# Patient Record
Sex: MALE | Race: WHITE | NOT HISPANIC OR LATINO | Employment: OTHER | URBAN - METROPOLITAN AREA
[De-identification: names, ages, dates, MRNs, and addresses within clinical notes are randomized per-mention and may not be internally consistent; named-entity substitution may affect disease eponyms.]

---

## 2023-02-09 PROBLEM — B02.9 SHINGLES: Status: ACTIVE | Noted: 2023-02-09

## 2023-02-09 PROBLEM — R23.3 PETECHIAL RASH: Status: ACTIVE | Noted: 2023-02-09

## 2023-02-09 PROBLEM — L70.8 ACNEIFORM DERMATITIS: Status: ACTIVE | Noted: 2023-02-09

## 2023-02-09 PROBLEM — L03.90 CELLULITIS: Status: ACTIVE | Noted: 2023-02-09

## 2023-02-09 PROBLEM — I25.10 CAD (CORONARY ARTERY DISEASE): Status: ACTIVE | Noted: 2023-02-09

## 2023-02-09 PROBLEM — N52.9 MALE ERECTILE DISORDER OF ORGANIC ORIGIN: Status: ACTIVE | Noted: 2023-02-09

## 2023-02-09 PROBLEM — I10 HYPERTENSION: Status: ACTIVE | Noted: 2023-02-09

## 2023-02-09 PROBLEM — I77.89 ENLARGED THORACIC AORTA (CMS-HCC): Status: ACTIVE | Noted: 2023-02-09

## 2023-02-09 PROBLEM — I35.0 AORTIC STENOSIS: Status: ACTIVE | Noted: 2023-02-09

## 2023-02-09 PROBLEM — L57.0 SOLAR KERATOSIS: Status: ACTIVE | Noted: 2023-02-09

## 2023-02-09 PROBLEM — J03.00 STREP TONSILLITIS: Status: RESOLVED | Noted: 2023-02-09 | Resolved: 2023-02-09

## 2023-02-09 PROBLEM — D23.20: Status: ACTIVE | Noted: 2023-02-09

## 2023-02-09 PROBLEM — E03.9 HYPOTHYROIDISM: Status: ACTIVE | Noted: 2023-02-09

## 2023-02-09 PROBLEM — E78.5 HYPERLIPIDEMIA: Status: ACTIVE | Noted: 2023-02-09

## 2023-02-09 RX ORDER — AMLODIPINE BESYLATE 2.5 MG/1
1 TABLET ORAL DAILY
COMMUNITY
End: 2023-04-04 | Stop reason: SDUPTHER

## 2023-02-09 RX ORDER — METOPROLOL TARTRATE 25 MG/1
1 TABLET, FILM COATED ORAL 2 TIMES DAILY
COMMUNITY

## 2023-02-09 RX ORDER — LEVOTHYROXINE SODIUM 150 UG/1
1 TABLET ORAL DAILY
COMMUNITY
End: 2023-04-04 | Stop reason: SDUPTHER

## 2023-02-09 RX ORDER — MULTIVITAMIN
1 TABLET ORAL DAILY
COMMUNITY

## 2023-04-04 ENCOUNTER — OFFICE VISIT (OUTPATIENT)
Dept: PRIMARY CARE | Facility: CLINIC | Age: 73
End: 2023-04-04
Payer: MEDICARE

## 2023-04-04 VITALS
OXYGEN SATURATION: 98 % | WEIGHT: 200 LBS | HEART RATE: 63 BPM | TEMPERATURE: 97.6 F | BODY MASS INDEX: 28.63 KG/M2 | SYSTOLIC BLOOD PRESSURE: 120 MMHG | HEIGHT: 70 IN | RESPIRATION RATE: 17 BRPM | DIASTOLIC BLOOD PRESSURE: 70 MMHG

## 2023-04-04 DIAGNOSIS — I25.10 CORONARY ARTERY DISEASE INVOLVING NATIVE HEART WITHOUT ANGINA PECTORIS, UNSPECIFIED VESSEL OR LESION TYPE: ICD-10-CM

## 2023-04-04 DIAGNOSIS — R73.9 HYPERGLYCEMIA: ICD-10-CM

## 2023-04-04 DIAGNOSIS — E03.9 HYPOTHYROIDISM, UNSPECIFIED TYPE: ICD-10-CM

## 2023-04-04 DIAGNOSIS — Z12.5 PROSTATE CANCER SCREENING: ICD-10-CM

## 2023-04-04 DIAGNOSIS — E78.2 MIXED HYPERLIPIDEMIA: ICD-10-CM

## 2023-04-04 DIAGNOSIS — Z00.00 MEDICARE ANNUAL WELLNESS VISIT, SUBSEQUENT: Primary | ICD-10-CM

## 2023-04-04 DIAGNOSIS — I10 PRIMARY HYPERTENSION: ICD-10-CM

## 2023-04-04 PROCEDURE — G0439 PPPS, SUBSEQ VISIT: HCPCS | Performed by: FAMILY MEDICINE

## 2023-04-04 PROCEDURE — 99214 OFFICE O/P EST MOD 30 MIN: CPT | Performed by: FAMILY MEDICINE

## 2023-04-04 RX ORDER — AMLODIPINE BESYLATE 2.5 MG/1
2.5 TABLET ORAL DAILY
Qty: 90 TABLET | Refills: 3 | Status: SHIPPED | OUTPATIENT
Start: 2023-04-04 | End: 2024-04-03

## 2023-04-04 RX ORDER — LEVOTHYROXINE SODIUM 150 UG/1
150 TABLET ORAL DAILY
Qty: 90 TABLET | Refills: 2 | Status: SHIPPED | OUTPATIENT
Start: 2023-04-04 | End: 2024-01-30 | Stop reason: SDUPTHER

## 2023-04-04 RX ORDER — ROSUVASTATIN CALCIUM 40 MG/1
40 TABLET, COATED ORAL DAILY
COMMUNITY

## 2023-04-04 ASSESSMENT — ACTIVITIES OF DAILY LIVING (ADL)
MANAGING_FINANCES: INDEPENDENT
DOING_HOUSEWORK: INDEPENDENT
BATHING: INDEPENDENT
DRESSING: INDEPENDENT
GROCERY_SHOPPING: INDEPENDENT
TAKING_MEDICATION: INDEPENDENT

## 2023-04-04 ASSESSMENT — ENCOUNTER SYMPTOMS
DEPRESSION: 0
RESPIRATORY NEGATIVE: 1
GASTROINTESTINAL NEGATIVE: 1
ALLERGIC/IMMUNOLOGIC NEGATIVE: 1
MUSCULOSKELETAL NEGATIVE: 1
NEUROLOGICAL NEGATIVE: 1
ENDOCRINE NEGATIVE: 1
OCCASIONAL FEELINGS OF UNSTEADINESS: 0
CONSTITUTIONAL NEGATIVE: 1
HEMATOLOGIC/LYMPHATIC NEGATIVE: 1
PSYCHIATRIC NEGATIVE: 1
EYES NEGATIVE: 1
LOSS OF SENSATION IN FEET: 0
CARDIOVASCULAR NEGATIVE: 1

## 2023-04-04 ASSESSMENT — PATIENT HEALTH QUESTIONNAIRE - PHQ9
2. FEELING DOWN, DEPRESSED OR HOPELESS: NOT AT ALL
1. LITTLE INTEREST OR PLEASURE IN DOING THINGS: NOT AT ALL
SUM OF ALL RESPONSES TO PHQ9 QUESTIONS 1 AND 2: 0

## 2023-04-04 NOTE — PROGRESS NOTES
"Subjective   Reason for Visit: Hunter Combs is an 72 y.o. male here for a Medicare Wellness visit.     Past Medical, Surgical, and Family History reviewed and updated in chart.    Reviewed all medications by prescribing practitioner or clinical pharmacist (such as prescriptions, OTCs, herbal therapies and supplements) and documented in the medical record.    HPI    Patient Care Team:  Sabino Gibson DO as PCP - General  JENSEN Roach as PCP - [facility] Attributed Provider     Review of Systems   Constitutional: Negative.    HENT: Negative.     Eyes: Negative.    Respiratory: Negative.     Cardiovascular: Negative.    Gastrointestinal: Negative.    Endocrine: Negative.    Genitourinary: Negative.    Musculoskeletal: Negative.    Skin: Negative.    Allergic/Immunologic: Negative.    Neurological: Negative.    Hematological: Negative.    Psychiatric/Behavioral: Negative.         Objective   Vitals:  /70 (BP Location: Right arm, Patient Position: Sitting)   Pulse 63   Temp 36.4 °C (97.6 °F)   Resp 17   Ht 1.778 m (5' 10\")   Wt 90.7 kg (200 lb)   SpO2 98%   BMI 28.70 kg/m²       Physical Exam  Vitals and nursing note reviewed.   Constitutional:       Appearance: Normal appearance.   HENT:      Head: Normocephalic and atraumatic.      Nose: Nose normal.      Mouth/Throat:      Pharynx: Oropharynx is clear.   Eyes:      Extraocular Movements: Extraocular movements intact.      Conjunctiva/sclera: Conjunctivae normal.      Pupils: Pupils are equal, round, and reactive to light.   Neck:      Vascular: No carotid bruit.   Cardiovascular:      Rate and Rhythm: Normal rate and regular rhythm.      Pulses: Normal pulses.      Heart sounds: Normal heart sounds.   Pulmonary:      Effort: Pulmonary effort is normal. No respiratory distress.      Breath sounds: Normal breath sounds. No wheezing, rhonchi or rales.   Abdominal:      General: Abdomen is flat. Bowel sounds are normal. There is no distension.    "   Palpations: Abdomen is soft.      Tenderness: There is no abdominal tenderness.      Hernia: No hernia is present.   Musculoskeletal:         General: No swelling or tenderness. Normal range of motion.      Cervical back: Normal range of motion and neck supple. No tenderness.   Lymphadenopathy:      Cervical: No cervical adenopathy.   Skin:     General: Skin is warm and dry.      Capillary Refill: Capillary refill takes less than 2 seconds.   Neurological:      General: No focal deficit present.      Mental Status: He is alert and oriented to person, place, and time.      Cranial Nerves: No cranial nerve deficit.   Psychiatric:         Attention and Perception: Attention and perception normal.         Mood and Affect: Mood normal.         Behavior: Behavior normal.         Thought Content: Thought content normal.         Judgment: Judgment normal.         Assessment/Plan   Problem List Items Addressed This Visit          Endocrine/Metabolic    Hypothyroidism - Primary   1. Medicare annual wellness visit, subsequent      2. Hypothyroidism, unspecified type    - levothyroxine (Synthroid, Levoxyl) 150 mcg tablet; Take 1 tablet (150 mcg) by mouth once daily.  Dispense: 90 tablet; Refill: 2  - Hemoglobin A1C; Future  - TSH with reflex to Free T4 if abnormal; Future    3. Coronary artery disease involving native heart without angina pectoris, unspecified vessel or lesion type    - Comprehensive Metabolic Panel; Future  - Hemoglobin A1C; Future  - Lipid Panel; Future  - CBC and Auto Differential; Future  - amLODIPine (Norvasc) 2.5 mg tablet; Take 1 tablet (2.5 mg) by mouth once daily.  Dispense: 90 tablet; Refill: 3    4. Primary hypertension    - Comprehensive Metabolic Panel; Future  - Hemoglobin A1C; Future  - Lipid Panel; Future  - CBC and Auto Differential; Future    5. Mixed hyperlipidemia    - Comprehensive Metabolic Panel; Future  - Hemoglobin A1C; Future  - Lipid Panel; Future  - CBC and Auto Differential;  Future    6. Prostate cancer screening    - Prostate Specific Antigen; Future    7. Hyperglycemia    - Hemoglobin A1C; Future

## 2023-04-14 ENCOUNTER — LAB (OUTPATIENT)
Dept: LAB | Facility: LAB | Age: 73
End: 2023-04-14
Payer: MEDICARE

## 2023-04-14 DIAGNOSIS — Z12.5 PROSTATE CANCER SCREENING: ICD-10-CM

## 2023-04-14 DIAGNOSIS — R73.9 HYPERGLYCEMIA: ICD-10-CM

## 2023-04-14 DIAGNOSIS — E78.2 MIXED HYPERLIPIDEMIA: ICD-10-CM

## 2023-04-14 DIAGNOSIS — E03.9 HYPOTHYROIDISM, UNSPECIFIED TYPE: ICD-10-CM

## 2023-04-14 DIAGNOSIS — I25.10 CORONARY ARTERY DISEASE INVOLVING NATIVE HEART WITHOUT ANGINA PECTORIS, UNSPECIFIED VESSEL OR LESION TYPE: ICD-10-CM

## 2023-04-14 DIAGNOSIS — I10 PRIMARY HYPERTENSION: ICD-10-CM

## 2023-04-14 LAB
ALANINE AMINOTRANSFERASE (SGPT) (U/L) IN SER/PLAS: 28 U/L (ref 10–52)
ALBUMIN (G/DL) IN SER/PLAS: 4.6 G/DL (ref 3.4–5)
ALKALINE PHOSPHATASE (U/L) IN SER/PLAS: 61 U/L (ref 33–136)
ANION GAP IN SER/PLAS: 16 MMOL/L (ref 10–20)
ASPARTATE AMINOTRANSFERASE (SGOT) (U/L) IN SER/PLAS: 30 U/L (ref 9–39)
BASOPHILS (10*3/UL) IN BLOOD BY AUTOMATED COUNT: 0.05 X10E9/L (ref 0–0.1)
BASOPHILS/100 LEUKOCYTES IN BLOOD BY AUTOMATED COUNT: 0.7 % (ref 0–2)
BILIRUBIN TOTAL (MG/DL) IN SER/PLAS: 0.7 MG/DL (ref 0–1.2)
CALCIUM (MG/DL) IN SER/PLAS: 9.9 MG/DL (ref 8.6–10.3)
CARBON DIOXIDE, TOTAL (MMOL/L) IN SER/PLAS: 24 MMOL/L (ref 21–32)
CHLORIDE (MMOL/L) IN SER/PLAS: 104 MMOL/L (ref 98–107)
CHOLESTEROL (MG/DL) IN SER/PLAS: 129 MG/DL (ref 0–199)
CHOLESTEROL IN HDL (MG/DL) IN SER/PLAS: 43.8 MG/DL
CHOLESTEROL/HDL RATIO: 2.9
CREATININE (MG/DL) IN SER/PLAS: 1.01 MG/DL (ref 0.5–1.3)
EOSINOPHILS (10*3/UL) IN BLOOD BY AUTOMATED COUNT: 0.08 X10E9/L (ref 0–0.7)
EOSINOPHILS/100 LEUKOCYTES IN BLOOD BY AUTOMATED COUNT: 1.1 % (ref 0–6)
ERYTHROCYTE DISTRIBUTION WIDTH (RATIO) BY AUTOMATED COUNT: 12.9 % (ref 11.5–14.5)
ERYTHROCYTE MEAN CORPUSCULAR HEMOGLOBIN CONCENTRATION (G/DL) BY AUTOMATED: 33.6 G/DL (ref 32–36)
ERYTHROCYTE MEAN CORPUSCULAR VOLUME (FL) BY AUTOMATED COUNT: 88 FL (ref 80–100)
ERYTHROCYTES (10*6/UL) IN BLOOD BY AUTOMATED COUNT: 5.47 X10E12/L (ref 4.5–5.9)
ESTIMATED AVERAGE GLUCOSE FOR HBA1C: 105 MG/DL
GFR MALE: 80 ML/MIN/1.73M2
GLUCOSE (MG/DL) IN SER/PLAS: 74 MG/DL (ref 74–99)
HEMATOCRIT (%) IN BLOOD BY AUTOMATED COUNT: 48.2 % (ref 41–52)
HEMOGLOBIN (G/DL) IN BLOOD: 16.2 G/DL (ref 13.5–17.5)
HEMOGLOBIN A1C/HEMOGLOBIN TOTAL IN BLOOD: 5.3 %
IMMATURE GRANULOCYTES/100 LEUKOCYTES IN BLOOD BY AUTOMATED COUNT: 0.1 % (ref 0–0.9)
LDL: 67 MG/DL (ref 0–99)
LEUKOCYTES (10*3/UL) IN BLOOD BY AUTOMATED COUNT: 7.4 X10E9/L (ref 4.4–11.3)
LYMPHOCYTES (10*3/UL) IN BLOOD BY AUTOMATED COUNT: 1.85 X10E9/L (ref 1.2–4.8)
LYMPHOCYTES/100 LEUKOCYTES IN BLOOD BY AUTOMATED COUNT: 25.1 % (ref 13–44)
MONOCYTES (10*3/UL) IN BLOOD BY AUTOMATED COUNT: 0.85 X10E9/L (ref 0.1–1)
MONOCYTES/100 LEUKOCYTES IN BLOOD BY AUTOMATED COUNT: 11.5 % (ref 2–10)
NEUTROPHILS (10*3/UL) IN BLOOD BY AUTOMATED COUNT: 4.52 X10E9/L (ref 1.2–7.7)
NEUTROPHILS/100 LEUKOCYTES IN BLOOD BY AUTOMATED COUNT: 61.5 % (ref 40–80)
PLATELETS (10*3/UL) IN BLOOD AUTOMATED COUNT: 216 X10E9/L (ref 150–450)
POTASSIUM (MMOL/L) IN SER/PLAS: 4.4 MMOL/L (ref 3.5–5.3)
PROSTATE SPECIFIC AG (NG/ML) IN SER/PLAS: 0.14 NG/ML (ref 0–4)
PROTEIN TOTAL: 7.2 G/DL (ref 6.4–8.2)
SODIUM (MMOL/L) IN SER/PLAS: 140 MMOL/L (ref 136–145)
THYROTROPIN (MIU/L) IN SER/PLAS BY DETECTION LIMIT <= 0.05 MIU/L: 0.38 MIU/L (ref 0.44–3.98)
THYROXINE (T4) FREE (NG/DL) IN SER/PLAS: 1.22 NG/DL (ref 0.61–1.12)
TRIGLYCERIDE (MG/DL) IN SER/PLAS: 91 MG/DL (ref 0–149)
UREA NITROGEN (MG/DL) IN SER/PLAS: 19 MG/DL (ref 6–23)
VLDL: 18 MG/DL (ref 0–40)

## 2023-04-14 PROCEDURE — G0103 PSA SCREENING: HCPCS

## 2023-04-14 PROCEDURE — 80053 COMPREHEN METABOLIC PANEL: CPT

## 2023-04-14 PROCEDURE — 84439 ASSAY OF FREE THYROXINE: CPT

## 2023-04-14 PROCEDURE — 85025 COMPLETE CBC W/AUTO DIFF WBC: CPT

## 2023-04-14 PROCEDURE — 83036 HEMOGLOBIN GLYCOSYLATED A1C: CPT

## 2023-04-14 PROCEDURE — 80061 LIPID PANEL: CPT

## 2023-04-14 PROCEDURE — 84443 ASSAY THYROID STIM HORMONE: CPT

## 2023-04-14 PROCEDURE — 36415 COLL VENOUS BLD VENIPUNCTURE: CPT

## 2023-04-17 ENCOUNTER — TELEPHONE (OUTPATIENT)
Dept: PRIMARY CARE | Facility: CLINIC | Age: 73
End: 2023-04-17
Payer: MEDICARE

## 2023-04-17 NOTE — TELEPHONE ENCOUNTER
----- Message from Sabino Gibson DO sent at 4/16/2023  4:53 PM EDT -----  Call Hunter Combs Their labs were normal  His t4 was elevated by 0.1 we will keep an eye on this   The resot of his labs look good

## 2024-01-30 DIAGNOSIS — E03.9 HYPOTHYROIDISM, UNSPECIFIED TYPE: ICD-10-CM

## 2024-01-30 RX ORDER — LEVOTHYROXINE SODIUM 150 UG/1
150 TABLET ORAL DAILY
Qty: 90 TABLET | Refills: 3 | Status: SHIPPED | OUTPATIENT
Start: 2024-01-30

## 2024-01-30 NOTE — TELEPHONE ENCOUNTER
Patient requests prescription below    Last Office Visit: 4/4/2023     Requested Prescriptions     Pending Prescriptions Disp Refills    levothyroxine (Synthroid, Levoxyl) 150 mcg tablet 90 tablet 2     Sig: Take 1 tablet (150 mcg) by mouth once daily.   \

## 2024-04-05 ENCOUNTER — APPOINTMENT (OUTPATIENT)
Dept: PRIMARY CARE | Facility: CLINIC | Age: 74
End: 2024-04-05
Payer: MEDICARE

## 2024-04-12 ENCOUNTER — LAB (OUTPATIENT)
Dept: LAB | Facility: LAB | Age: 74
End: 2024-04-12
Payer: MEDICARE

## 2024-04-12 ENCOUNTER — OFFICE VISIT (OUTPATIENT)
Dept: PRIMARY CARE | Facility: CLINIC | Age: 74
End: 2024-04-12
Payer: MEDICARE

## 2024-04-12 VITALS
SYSTOLIC BLOOD PRESSURE: 144 MMHG | BODY MASS INDEX: 29.06 KG/M2 | TEMPERATURE: 98.1 F | HEIGHT: 70 IN | WEIGHT: 203 LBS | RESPIRATION RATE: 18 BRPM | DIASTOLIC BLOOD PRESSURE: 86 MMHG | HEART RATE: 61 BPM

## 2024-04-12 DIAGNOSIS — Z12.5 PROSTATE CANCER SCREENING: ICD-10-CM

## 2024-04-12 DIAGNOSIS — E78.2 MIXED HYPERLIPIDEMIA: ICD-10-CM

## 2024-04-12 DIAGNOSIS — E03.9 HYPOTHYROIDISM, UNSPECIFIED TYPE: ICD-10-CM

## 2024-04-12 DIAGNOSIS — Z00.00 MEDICARE ANNUAL WELLNESS VISIT, SUBSEQUENT: ICD-10-CM

## 2024-04-12 DIAGNOSIS — R73.9 HYPERGLYCEMIA: ICD-10-CM

## 2024-04-12 DIAGNOSIS — I25.118 ATHEROSCLEROTIC HEART DISEASE OF NATIVE CORONARY ARTERY WITH OTHER FORMS OF ANGINA PECTORIS (CMS-HCC): ICD-10-CM

## 2024-04-12 DIAGNOSIS — Z00.00 MEDICARE ANNUAL WELLNESS VISIT, SUBSEQUENT: Primary | ICD-10-CM

## 2024-04-12 DIAGNOSIS — R25.2 LEG CRAMPS: ICD-10-CM

## 2024-04-12 DIAGNOSIS — I77.89 ENLARGED THORACIC AORTA (CMS-HCC): ICD-10-CM

## 2024-04-12 DIAGNOSIS — I10 PRIMARY HYPERTENSION: ICD-10-CM

## 2024-04-12 LAB
ALBUMIN SERPL BCP-MCNC: 4.7 G/DL (ref 3.4–5)
ALP SERPL-CCNC: 60 U/L (ref 33–136)
ALT SERPL W P-5'-P-CCNC: 25 U/L (ref 10–52)
ANION GAP SERPL CALC-SCNC: 11 MMOL/L (ref 10–20)
AST SERPL W P-5'-P-CCNC: 27 U/L (ref 9–39)
BASOPHILS # BLD AUTO: 0.03 X10*3/UL (ref 0–0.1)
BASOPHILS NFR BLD AUTO: 0.5 %
BILIRUB SERPL-MCNC: 0.6 MG/DL (ref 0–1.2)
BUN SERPL-MCNC: 15 MG/DL (ref 6–23)
CALCIUM SERPL-MCNC: 10 MG/DL (ref 8.6–10.3)
CHLORIDE SERPL-SCNC: 104 MMOL/L (ref 98–107)
CHOLEST SERPL-MCNC: 137 MG/DL (ref 0–199)
CHOLESTEROL/HDL RATIO: 2.7
CO2 SERPL-SCNC: 29 MMOL/L (ref 21–32)
CREAT SERPL-MCNC: 0.92 MG/DL (ref 0.5–1.3)
EGFRCR SERPLBLD CKD-EPI 2021: 89 ML/MIN/1.73M*2
EOSINOPHIL # BLD AUTO: 0.06 X10*3/UL (ref 0–0.4)
EOSINOPHIL NFR BLD AUTO: 0.9 %
ERYTHROCYTE [DISTWIDTH] IN BLOOD BY AUTOMATED COUNT: 13.2 % (ref 11.5–14.5)
EST. AVERAGE GLUCOSE BLD GHB EST-MCNC: 97 MG/DL
GLUCOSE SERPL-MCNC: 82 MG/DL (ref 74–99)
HBA1C MFR BLD: 5 %
HCT VFR BLD AUTO: 47.2 % (ref 41–52)
HDLC SERPL-MCNC: 51.4 MG/DL
HGB BLD-MCNC: 16.2 G/DL (ref 13.5–17.5)
IMM GRANULOCYTES # BLD AUTO: 0.01 X10*3/UL (ref 0–0.5)
IMM GRANULOCYTES NFR BLD AUTO: 0.2 % (ref 0–0.9)
LDLC SERPL CALC-MCNC: 68 MG/DL
LYMPHOCYTES # BLD AUTO: 1.86 X10*3/UL (ref 0.8–3)
LYMPHOCYTES NFR BLD AUTO: 28.2 %
MAGNESIUM SERPL-MCNC: 2.16 MG/DL (ref 1.6–2.4)
MCH RBC QN AUTO: 29.9 PG (ref 26–34)
MCHC RBC AUTO-ENTMCNC: 34.3 G/DL (ref 32–36)
MCV RBC AUTO: 87 FL (ref 80–100)
MONOCYTES # BLD AUTO: 0.78 X10*3/UL (ref 0.05–0.8)
MONOCYTES NFR BLD AUTO: 11.8 %
NEUTROPHILS # BLD AUTO: 3.86 X10*3/UL (ref 1.6–5.5)
NEUTROPHILS NFR BLD AUTO: 58.4 %
NON HDL CHOLESTEROL: 86 MG/DL (ref 0–149)
NRBC BLD-RTO: 0 /100 WBCS (ref 0–0)
PLATELET # BLD AUTO: 255 X10*3/UL (ref 150–450)
POTASSIUM SERPL-SCNC: 4.3 MMOL/L (ref 3.5–5.3)
PROT SERPL-MCNC: 7.3 G/DL (ref 6.4–8.2)
PSA SERPL-MCNC: 0.36 NG/ML
RBC # BLD AUTO: 5.42 X10*6/UL (ref 4.5–5.9)
SODIUM SERPL-SCNC: 140 MMOL/L (ref 136–145)
T4 FREE SERPL-MCNC: 1.01 NG/DL (ref 0.61–1.12)
TRIGL SERPL-MCNC: 90 MG/DL (ref 0–149)
TSH SERPL-ACNC: 0.66 MIU/L (ref 0.44–3.98)
VLDL: 18 MG/DL (ref 0–40)
WBC # BLD AUTO: 6.6 X10*3/UL (ref 4.4–11.3)

## 2024-04-12 PROCEDURE — 84439 ASSAY OF FREE THYROXINE: CPT

## 2024-04-12 PROCEDURE — 99214 OFFICE O/P EST MOD 30 MIN: CPT | Performed by: FAMILY MEDICINE

## 2024-04-12 PROCEDURE — 84443 ASSAY THYROID STIM HORMONE: CPT

## 2024-04-12 PROCEDURE — 80053 COMPREHEN METABOLIC PANEL: CPT

## 2024-04-12 PROCEDURE — G0439 PPPS, SUBSEQ VISIT: HCPCS | Performed by: FAMILY MEDICINE

## 2024-04-12 PROCEDURE — 83735 ASSAY OF MAGNESIUM: CPT

## 2024-04-12 PROCEDURE — 80061 LIPID PANEL: CPT

## 2024-04-12 PROCEDURE — 85025 COMPLETE CBC W/AUTO DIFF WBC: CPT

## 2024-04-12 PROCEDURE — G0103 PSA SCREENING: HCPCS

## 2024-04-12 PROCEDURE — 83036 HEMOGLOBIN GLYCOSYLATED A1C: CPT

## 2024-04-12 PROCEDURE — 36415 COLL VENOUS BLD VENIPUNCTURE: CPT

## 2024-04-12 ASSESSMENT — ACTIVITIES OF DAILY LIVING (ADL)
DRESSING: INDEPENDENT
GROCERY_SHOPPING: INDEPENDENT
MANAGING_FINANCES: INDEPENDENT
TAKING_MEDICATION: INDEPENDENT
DOING_HOUSEWORK: INDEPENDENT
BATHING: INDEPENDENT

## 2024-04-12 ASSESSMENT — ENCOUNTER SYMPTOMS
EYES NEGATIVE: 1
RESPIRATORY NEGATIVE: 1
CONSTITUTIONAL NEGATIVE: 1
NEUROLOGICAL NEGATIVE: 1
GASTROINTESTINAL NEGATIVE: 1
CARDIOVASCULAR NEGATIVE: 1
HEMATOLOGIC/LYMPHATIC NEGATIVE: 1
ENDOCRINE NEGATIVE: 1
BACK PAIN: 1
ARTHRALGIAS: 1
ALLERGIC/IMMUNOLOGIC NEGATIVE: 1
MYALGIAS: 1
PSYCHIATRIC NEGATIVE: 1

## 2024-04-12 ASSESSMENT — PATIENT HEALTH QUESTIONNAIRE - PHQ9
2. FEELING DOWN, DEPRESSED OR HOPELESS: NOT AT ALL
SUM OF ALL RESPONSES TO PHQ9 QUESTIONS 1 AND 2: 0
1. LITTLE INTEREST OR PLEASURE IN DOING THINGS: NOT AT ALL

## 2024-04-12 NOTE — PROGRESS NOTES
"Subjective   Reason for Visit: Hunter Combs is an 73 y.o. male here for a Medicare Wellness visit.     Past Medical, Surgical, and Family History reviewed and updated in chart.    Reviewed all medications by prescribing practitioner or clinical pharmacist (such as prescriptions, OTCs, herbal therapies and supplements) and documented in the medical record.    HPI    Patient Care Team:  Sabino Gibson DO as PCP - General     Review of Systems   Constitutional: Negative.    HENT: Negative.     Eyes: Negative.    Respiratory: Negative.     Cardiovascular: Negative.    Gastrointestinal: Negative.    Endocrine: Negative.    Genitourinary: Negative.    Musculoskeletal:  Positive for arthralgias, back pain and myalgias.   Skin: Negative.    Allergic/Immunologic: Negative.    Neurological: Negative.    Hematological: Negative.    Psychiatric/Behavioral: Negative.         Objective   Vitals:  /86   Pulse 61   Temp 36.7 °C (98.1 °F)   Resp 18   Ht 1.778 m (5' 10\")   Wt 92.1 kg (203 lb)   BMI 29.13 kg/m²       Physical Exam  Vitals and nursing note reviewed.   Constitutional:       Appearance: Normal appearance.   HENT:      Head: Normocephalic and atraumatic.      Nose: Nose normal.      Mouth/Throat:      Pharynx: Oropharynx is clear.   Eyes:      Extraocular Movements: Extraocular movements intact.      Conjunctiva/sclera: Conjunctivae normal.      Pupils: Pupils are equal, round, and reactive to light.   Neck:      Vascular: No carotid bruit.   Cardiovascular:      Rate and Rhythm: Normal rate and regular rhythm.      Pulses: Normal pulses.      Heart sounds: Normal heart sounds.   Pulmonary:      Effort: Pulmonary effort is normal. No respiratory distress.      Breath sounds: Normal breath sounds. No wheezing, rhonchi or rales.   Abdominal:      General: Abdomen is flat. Bowel sounds are normal. There is no distension.      Palpations: Abdomen is soft.      Tenderness: There is no abdominal tenderness.      " Hernia: No hernia is present.   Musculoskeletal:         General: No swelling. Normal range of motion.      Cervical back: Normal range of motion and neck supple. No tenderness.      Thoracic back: Tenderness present.        Back:    Lymphadenopathy:      Cervical: No cervical adenopathy.   Skin:     General: Skin is warm and dry.      Capillary Refill: Capillary refill takes less than 2 seconds.   Neurological:      General: No focal deficit present.      Mental Status: He is alert and oriented to person, place, and time.      Cranial Nerves: No cranial nerve deficit.   Psychiatric:         Attention and Perception: Attention and perception normal.         Mood and Affect: Mood normal.         Behavior: Behavior normal.         Thought Content: Thought content normal.         Judgment: Judgment normal.         Assessment/Plan   Problem List Items Addressed This Visit       Atherosclerotic heart disease of native coronary artery with other forms of angina pectoris (CMS-HCC)    Current Assessment & Plan     Dx reviewed pt sees cardio         Relevant Orders    CBC and Auto Differential    Comprehensive Metabolic Panel    Hemoglobin A1C    Lipid Panel    Prostate Specific Antigen    Thyroid Stimulating Hormone    T4, free    Enlarged thoracic aorta (CMS-HCC)    Current Assessment & Plan     DX reviewed pt sees cardio and have valve repair         Relevant Orders    CBC and Auto Differential    Comprehensive Metabolic Panel    Hemoglobin A1C    Lipid Panel    Prostate Specific Antigen    Thyroid Stimulating Hormone    T4, free    Hyperlipidemia    Relevant Orders    CBC and Auto Differential    Comprehensive Metabolic Panel    Hemoglobin A1C    Lipid Panel    Prostate Specific Antigen    Thyroid Stimulating Hormone    T4, free    Hypertension    Relevant Orders    CBC and Auto Differential    Comprehensive Metabolic Panel    Hemoglobin A1C    Lipid Panel    Prostate Specific Antigen    Thyroid Stimulating Hormone     T4, free    Hypothyroidism    Relevant Orders    CBC and Auto Differential    Comprehensive Metabolic Panel    Hemoglobin A1C    Lipid Panel    Prostate Specific Antigen    Thyroid Stimulating Hormone    T4, free     Other Visit Diagnoses       Medicare annual wellness visit, subsequent    -  Primary    Relevant Orders    CBC and Auto Differential    Comprehensive Metabolic Panel    Hemoglobin A1C    Lipid Panel    Prostate Specific Antigen    Thyroid Stimulating Hormone    T4, free    Prostate cancer screening        Relevant Orders    CBC and Auto Differential    Comprehensive Metabolic Panel    Hemoglobin A1C    Lipid Panel    Prostate Specific Antigen    Thyroid Stimulating Hormone    T4, free    Hyperglycemia        Relevant Orders    Hemoglobin A1C        Pt to try therapeutic massage for back

## 2024-04-16 ENCOUNTER — TELEPHONE (OUTPATIENT)
Dept: PRIMARY CARE | Facility: CLINIC | Age: 74
End: 2024-04-16
Payer: MEDICARE

## 2024-04-16 NOTE — TELEPHONE ENCOUNTER
----- Message from Sabino Gibson DO sent at 4/16/2024 10:27 AM EDT -----  Call Hunter Combs Their labs were normal

## 2024-04-17 ENCOUNTER — OFFICE VISIT (OUTPATIENT)
Dept: ORTHOPEDIC SURGERY | Facility: CLINIC | Age: 74
End: 2024-04-17
Payer: MEDICARE

## 2024-04-17 DIAGNOSIS — M17.11 PRIMARY OSTEOARTHRITIS OF RIGHT KNEE: Primary | ICD-10-CM

## 2024-04-17 PROCEDURE — 99214 OFFICE O/P EST MOD 30 MIN: CPT | Performed by: STUDENT IN AN ORGANIZED HEALTH CARE EDUCATION/TRAINING PROGRAM

## 2024-04-17 PROCEDURE — 2500000005 HC RX 250 GENERAL PHARMACY W/O HCPCS: Performed by: STUDENT IN AN ORGANIZED HEALTH CARE EDUCATION/TRAINING PROGRAM

## 2024-04-17 PROCEDURE — 20610 DRAIN/INJ JOINT/BURSA W/O US: CPT | Mod: RT | Performed by: STUDENT IN AN ORGANIZED HEALTH CARE EDUCATION/TRAINING PROGRAM

## 2024-04-17 PROCEDURE — 1159F MED LIST DOCD IN RCRD: CPT | Performed by: STUDENT IN AN ORGANIZED HEALTH CARE EDUCATION/TRAINING PROGRAM

## 2024-04-17 PROCEDURE — 2500000004 HC RX 250 GENERAL PHARMACY W/ HCPCS (ALT 636 FOR OP/ED): Performed by: STUDENT IN AN ORGANIZED HEALTH CARE EDUCATION/TRAINING PROGRAM

## 2024-04-17 PROCEDURE — 1036F TOBACCO NON-USER: CPT | Performed by: STUDENT IN AN ORGANIZED HEALTH CARE EDUCATION/TRAINING PROGRAM

## 2024-04-17 RX ORDER — LIDOCAINE HYDROCHLORIDE 10 MG/ML
4 INJECTION INFILTRATION; PERINEURAL
Status: COMPLETED | OUTPATIENT
Start: 2024-04-17 | End: 2024-04-17

## 2024-04-17 RX ORDER — TRIAMCINOLONE ACETONIDE 40 MG/ML
40 INJECTION, SUSPENSION INTRA-ARTICULAR; INTRAMUSCULAR
Status: COMPLETED | OUTPATIENT
Start: 2024-04-17 | End: 2024-04-17

## 2024-04-17 RX ADMIN — LIDOCAINE HYDROCHLORIDE 4 ML: 10 INJECTION, SOLUTION INFILTRATION; PERINEURAL at 05:55

## 2024-04-17 RX ADMIN — TRIAMCINOLONE ACETONIDE 40 MG: 40 INJECTION, SUSPENSION INTRA-ARTICULAR; INTRAMUSCULAR at 05:55

## 2024-04-17 NOTE — PROGRESS NOTES
No chief complaint on file.      HPI  Patient presents today for follow up of the right knee.  Patient with known history of knee arthritis had a intra-articular injection 4/14/2023 this provided him significant relief.  Hoping to get another injection today.  Patient with known history of moderate to severe tricompartmental knee arthritis.  Patient reports last injection provided relief for about a year.    Physical exam  General: Alert and oriented to place, person, and time.  No acute distress and breathing comfortably; pleasant and cooperative with the examination.  Extremity:  Right knee:  Skin healthy and intact  No gross swelling or ecchymosis  Alignment: Varus  Effusion: Mild  ROM: 10-1 10  Crepitance with range of motion  No pain with internal rotation of the hip  Tenderness to palpation: Medial joint line     No laxity to valgus stress  No laxity to varus stress  Negative Lachman´s test  Negative posterior drawer test  Mild pain with Michele´s test     Neurovascular exam normal distally  2+ DP pulse and good cap refill    Diagnostics:  No results found.     Procedures  L Inj/Asp: R knee on 4/17/2024 5:55 AM  Indications: pain  Details: 22 G needle, anterolateral approach  Medications: 40 mg triamcinolone acetonide 40 mg/mL; 4 mL lidocaine 10 mg/mL (1 %)  Consent was given by the patient. Immediately prior to procedure a time out was called to verify the correct patient, procedure, equipment, support staff and site/side marked as required. Patient was prepped and draped in the usual sterile fashion.            Assessment:  73-year-old male with moderate to severe tricompartmental knee arthritis    Treatment plan:  Will proceed with another injection today  Discussed activities to avoid as well as importance of using pain as a guide, if fails to improve may benefit from gel injection therapy  Discussed that ultimate treatment would be a total knee replacement  I discussed risks and benefits of  corticosteroid injection and the patient would like to proceed.  Discussed risks of skin depigmentation and the rare but possible intravascular injection of local anesthetic which can cause palpitations or arrhythmias. I discussed the possibility of a transient increase in blood sugar. I explained that the local anesthetic tends to work immediately, it may take 2-3 days for the full effect of the corticosteroid compound. Consent was obtained and side confirmed by the patient.      All of the patient's questions concerns answered

## 2024-12-22 ENCOUNTER — OFFICE VISIT (OUTPATIENT)
Dept: URGENT CARE | Age: 74
End: 2024-12-22
Payer: MEDICARE

## 2024-12-22 VITALS
BODY MASS INDEX: 29.13 KG/M2 | OXYGEN SATURATION: 98 % | DIASTOLIC BLOOD PRESSURE: 79 MMHG | TEMPERATURE: 97.9 F | SYSTOLIC BLOOD PRESSURE: 162 MMHG | RESPIRATION RATE: 16 BRPM | HEART RATE: 63 BPM | WEIGHT: 203 LBS

## 2024-12-22 DIAGNOSIS — J40 BRONCHITIS: Primary | ICD-10-CM

## 2024-12-22 PROCEDURE — 1159F MED LIST DOCD IN RCRD: CPT

## 2024-12-22 PROCEDURE — 3077F SYST BP >= 140 MM HG: CPT

## 2024-12-22 PROCEDURE — 1036F TOBACCO NON-USER: CPT

## 2024-12-22 PROCEDURE — 1160F RVW MEDS BY RX/DR IN RCRD: CPT

## 2024-12-22 PROCEDURE — 3078F DIAST BP <80 MM HG: CPT

## 2024-12-22 PROCEDURE — 99203 OFFICE O/P NEW LOW 30 MIN: CPT

## 2024-12-22 RX ORDER — BENZONATATE 200 MG/1
200 CAPSULE ORAL 3 TIMES DAILY PRN
Qty: 30 CAPSULE | Refills: 0 | Status: SHIPPED | OUTPATIENT
Start: 2024-12-22 | End: 2025-01-01

## 2024-12-22 RX ORDER — AZITHROMYCIN 250 MG/1
TABLET, FILM COATED ORAL
Qty: 6 TABLET | Refills: 0 | Status: SHIPPED | OUTPATIENT
Start: 2024-12-22 | End: 2024-12-27

## 2024-12-22 ASSESSMENT — ENCOUNTER SYMPTOMS: COUGH: 1

## 2024-12-22 NOTE — PROGRESS NOTES
Subjective   Patient ID: Hunter Combs is a 74 y.o. male. They present today with a chief complaint of URI and Cough.    History of Present Illness  Subjective  Hunter Combs is a 74 y.o. male here for evaluation of a cough. The cough is without wheezing, dyspnea or hemoptysis, productive of green/yellow sputum, harsh, worsening over time and is aggravated by infection. COVID positive 12/7.Onset of symptoms was 2 weeks ago, gradually worsening since that time. Associated symptoms include postnasal drip and sputum production. Patient does not have a history of asthma. Patient has not had recent travel. Patient does not have a history of smoking. Patient has not had a previous chest x-ray.    Gen: No fatigue, fever.     ENT: Endorses nasal congestion, rhinorrhea.  No hearing changes, ear pain, epistaxis, sore throat.    Pulmonary: Endorses cough and sputum production.  No shortness of breath, pleuritic pain, wheezing.     Heme/lymph: No swollen glands     GI: No abdominal pain, nausea, vomiting, diarrhea     Review of systems is otherwise negative unless stated above or in history of present illness.           History provided by:  Patient   used: No    URI  Presenting symptoms: cough    Cough        Past Medical History  Allergies as of 12/22/2024 - Reviewed 12/22/2024   Allergen Reaction Noted    Pollen extracts Itching 09/28/2021    Amoxicillin-pot clavulanate Other 02/09/2023    Codeine Unknown 11/20/2013    Fentanyl Other 04/12/2024       (Not in a hospital admission)       Past Medical History:   Diagnosis Date    Strep tonsillitis 02/09/2023       Past Surgical History:   Procedure Laterality Date    OTHER SURGICAL HISTORY  04/05/2022    Cataract surgery        reports that he has never smoked. He has never used smokeless tobacco. He reports current alcohol use of about 6.0 standard drinks of alcohol per week. He reports that he does not currently use drugs.    Review of Systems  Review of  Systems   Respiratory:  Positive for cough.                                   Objective    Vitals:    12/22/24 1413   BP: 162/79   Pulse: 63   Resp: 16   Temp: 36.6 °C (97.9 °F)   SpO2: 98%   Weight: 92.1 kg (203 lb)     No LMP for male patient.    Physical Exam  Vitals and nursing note reviewed.   Constitutional:       General: He is not in acute distress.     Appearance: Normal appearance. He is normal weight. He is ill-appearing. He is not toxic-appearing or diaphoretic.   HENT:      Nose: Congestion and rhinorrhea present.      Right Turbinates: Enlarged and swollen.      Left Turbinates: Enlarged and swollen.      Mouth/Throat:      Mouth: Mucous membranes are moist.      Pharynx: Postnasal drip present. No pharyngeal swelling or uvula swelling.      Tonsils: No tonsillar exudate or tonsillar abscesses.   Eyes:      General:         Right eye: No discharge.         Left eye: No discharge.      Conjunctiva/sclera: Conjunctivae normal.   Cardiovascular:      Rate and Rhythm: Normal rate and regular rhythm.      Pulses: Normal pulses.      Heart sounds: Normal heart sounds.   Pulmonary:      Effort: Pulmonary effort is normal. No respiratory distress.      Breath sounds: Normal breath sounds. No stridor. No wheezing, rhonchi or rales.   Musculoskeletal:      Cervical back: Normal range of motion and neck supple. No rigidity or tenderness.   Lymphadenopathy:      Cervical: No cervical adenopathy.   Skin:     General: Skin is warm and dry.      Coloration: Skin is not jaundiced or pale.      Findings: No bruising or erythema.   Neurological:      General: No focal deficit present.      Mental Status: He is alert.         Procedures    Point of Care Test & Imaging Results from this visit  No results found for this visit on 12/22/24.   No results found.    Diagnostic study results (if any) were reviewed by KAISER Molina-CNP.    Assessment/Plan   Allergies, medications, history, and pertinent labs/EKGs/Imaging  reviewed by JENSEN Molina.         Orders and Diagnoses  There are no diagnoses linked to this encounter.    Medical Admin Record      Patient disposition: Home    Electronically signed by JENSEN Molina  2:29 PM

## 2025-04-07 ENCOUNTER — APPOINTMENT (OUTPATIENT)
Dept: PRIMARY CARE | Facility: CLINIC | Age: 75
End: 2025-04-07
Payer: MEDICARE

## 2025-04-07 VITALS
RESPIRATION RATE: 17 BRPM | BODY MASS INDEX: 29.63 KG/M2 | HEART RATE: 68 BPM | DIASTOLIC BLOOD PRESSURE: 82 MMHG | OXYGEN SATURATION: 98 % | TEMPERATURE: 96.9 F | SYSTOLIC BLOOD PRESSURE: 132 MMHG | HEIGHT: 70 IN | WEIGHT: 207 LBS

## 2025-04-07 DIAGNOSIS — Z00.00 MEDICARE ANNUAL WELLNESS VISIT, SUBSEQUENT: Primary | ICD-10-CM

## 2025-04-07 DIAGNOSIS — E78.2 MIXED HYPERLIPIDEMIA: ICD-10-CM

## 2025-04-07 DIAGNOSIS — I25.10 CORONARY ARTERY DISEASE INVOLVING NATIVE HEART WITHOUT ANGINA PECTORIS, UNSPECIFIED VESSEL OR LESION TYPE: ICD-10-CM

## 2025-04-07 DIAGNOSIS — M54.50 ACUTE LEFT-SIDED LOW BACK PAIN WITHOUT SCIATICA: ICD-10-CM

## 2025-04-07 DIAGNOSIS — E03.9 HYPOTHYROIDISM, UNSPECIFIED TYPE: ICD-10-CM

## 2025-04-07 DIAGNOSIS — Z12.5 PROSTATE CANCER SCREENING: ICD-10-CM

## 2025-04-07 DIAGNOSIS — I10 PRIMARY HYPERTENSION: ICD-10-CM

## 2025-04-07 DIAGNOSIS — R73.9 HYPERGLYCEMIA: ICD-10-CM

## 2025-04-07 PROCEDURE — G0439 PPPS, SUBSEQ VISIT: HCPCS | Performed by: FAMILY MEDICINE

## 2025-04-07 PROCEDURE — 99214 OFFICE O/P EST MOD 30 MIN: CPT | Performed by: FAMILY MEDICINE

## 2025-04-07 RX ORDER — METHYLPREDNISOLONE 4 MG/1
TABLET ORAL
Qty: 21 TABLET | Refills: 0 | Status: SHIPPED | OUTPATIENT
Start: 2025-04-07 | End: 2025-04-13

## 2025-04-07 RX ORDER — LOSARTAN POTASSIUM AND HYDROCHLOROTHIAZIDE 12.5; 5 MG/1; MG/1
1 TABLET ORAL DAILY
COMMUNITY

## 2025-04-07 RX ORDER — AMLODIPINE BESYLATE 5 MG/1
5 TABLET ORAL DAILY
Start: 2025-04-07 | End: 2026-04-07

## 2025-04-07 ASSESSMENT — PATIENT HEALTH QUESTIONNAIRE - PHQ9
10. IF YOU CHECKED OFF ANY PROBLEMS, HOW DIFFICULT HAVE THESE PROBLEMS MADE IT FOR YOU TO DO YOUR WORK, TAKE CARE OF THINGS AT HOME, OR GET ALONG WITH OTHER PEOPLE: NOT DIFFICULT AT ALL
2. FEELING DOWN, DEPRESSED OR HOPELESS: NOT AT ALL
1. LITTLE INTEREST OR PLEASURE IN DOING THINGS: SEVERAL DAYS
SUM OF ALL RESPONSES TO PHQ9 QUESTIONS 1 AND 2: 1

## 2025-04-07 ASSESSMENT — ACTIVITIES OF DAILY LIVING (ADL)
DRESSING: INDEPENDENT
MANAGING_FINANCES: INDEPENDENT
DOING_HOUSEWORK: INDEPENDENT
TAKING_MEDICATION: INDEPENDENT
GROCERY_SHOPPING: INDEPENDENT
BATHING: INDEPENDENT

## 2025-04-07 NOTE — PROGRESS NOTES
"Subjective   Reason for Visit: Hunter Combs is an 74 y.o. male here for a Medicare Wellness visit.     Past Medical, Surgical, and Family History reviewed and updated in chart.         HPI    Patient Care Team:  Sabino Gibson DO as PCP - General  Sabino Gibson DO as PCP - [facility] Provider     Review of Systems   Constitutional:  Negative for appetite change and fatigue.   Eyes:  Negative for visual disturbance.   Respiratory:  Negative for chest tightness and shortness of breath.    Cardiovascular:  Negative for chest pain and leg swelling.   Gastrointestinal:  Negative for diarrhea, nausea and vomiting.   Endocrine: Negative for polydipsia, polyphagia and polyuria.   Genitourinary:  Negative for frequency and urgency.   Musculoskeletal:  Positive for arthralgias, back pain and myalgias.   Neurological:  Negative for dizziness, syncope, weakness, numbness and headaches.       Objective   Vitals:  /82   Pulse 68   Temp 36.1 °C (96.9 °F)   Resp 17   Ht 1.778 m (5' 10\")   Wt 93.9 kg (207 lb)   SpO2 98%   BMI 29.70 kg/m²       Physical Exam  Constitutional:       Appearance: Normal appearance.   HENT:      Head: Normocephalic.      Right Ear: Tympanic membrane, ear canal and external ear normal.      Left Ear: Tympanic membrane, ear canal and external ear normal.      Nose: Nose normal.      Mouth/Throat:      Mouth: Mucous membranes are moist.      Pharynx: Oropharynx is clear.   Eyes:      Conjunctiva/sclera: Conjunctivae normal.   Cardiovascular:      Rate and Rhythm: Normal rate and regular rhythm.   Pulmonary:      Effort: Pulmonary effort is normal.      Breath sounds: Normal breath sounds.   Abdominal:      Tenderness: There is no abdominal tenderness.   Musculoskeletal:         General: Normal range of motion.      Cervical back: Neck supple.      Lumbar back: Tenderness present.        Back:    Skin:     General: Skin is warm and dry.   Neurological:      General: No focal " deficit present.      Mental Status: He is alert and oriented to person, place, and time.   Psychiatric:         Mood and Affect: Mood normal.         Assessment & Plan  Medicare annual wellness visit, subsequent         Mixed hyperlipidemia    Orders:    Follow Up In [facility] - PCP; Future    CBC and Auto Differential; Future    Comprehensive Metabolic Panel; Future    Lipid Panel; Future    Prostate Specific Antigen; Future    Hemoglobin A1C; Future    Thyroid Stimulating Hormone; Future    T4, free; Future    Primary hypertension    Orders:    Follow Up In [facility] - PCP; Future    CBC and Auto Differential; Future    Comprehensive Metabolic Panel; Future    Lipid Panel; Future    Prostate Specific Antigen; Future    Hemoglobin A1C; Future    Thyroid Stimulating Hormone; Future    T4, free; Future    Hypothyroidism, unspecified type    Orders:    Follow Up In [facility] - PCP; Future    CBC and Auto Differential; Future    Comprehensive Metabolic Panel; Future    Lipid Panel; Future    Prostate Specific Antigen; Future    Hemoglobin A1C; Future    Thyroid Stimulating Hormone; Future    T4, free; Future    Prostate cancer screening    Orders:    Follow Up In [facility] - PCP; Future    CBC and Auto Differential; Future    Comprehensive Metabolic Panel; Future    Lipid Panel; Future    Prostate Specific Antigen; Future    Hemoglobin A1C; Future    Thyroid Stimulating Hormone; Future    T4, free; Future    Hyperglycemia    Orders:    Follow Up In [facility] - PCP; Future    CBC and Auto Differential; Future    Comprehensive Metabolic Panel; Future    Lipid Panel; Future    Prostate Specific Antigen; Future    Hemoglobin A1C; Future    Thyroid Stimulating Hormone; Future    T4, free; Future    Coronary artery disease involving native heart without angina pectoris, unspecified vessel or lesion type    Orders:    amLODIPine (Norvasc) 5 mg tablet; Take 1 tablet  (5 mg) by mouth once daily.    Acute left-sided low back pain without sciatica    Orders:    methylPREDNISolone (Medrol Dospak) 4 mg tablets; Take as directed on package.    Referral to Physical Therapy; Future

## 2025-04-08 LAB
ALBUMIN SERPL-MCNC: 4.5 G/DL (ref 3.6–5.1)
ALP SERPL-CCNC: 60 U/L (ref 35–144)
ALT SERPL-CCNC: 23 U/L (ref 9–46)
ANION GAP SERPL CALCULATED.4IONS-SCNC: 7 MMOL/L (CALC) (ref 7–17)
AST SERPL-CCNC: 27 U/L (ref 10–35)
BASOPHILS # BLD AUTO: 27 CELLS/UL (ref 0–200)
BASOPHILS NFR BLD AUTO: 0.4 %
BILIRUB SERPL-MCNC: 0.5 MG/DL (ref 0.2–1.2)
BUN SERPL-MCNC: 16 MG/DL (ref 7–25)
CALCIUM SERPL-MCNC: 9.6 MG/DL (ref 8.6–10.3)
CHLORIDE SERPL-SCNC: 105 MMOL/L (ref 98–110)
CHOLEST SERPL-MCNC: 140 MG/DL
CHOLEST/HDLC SERPL: 2.9 (CALC)
CO2 SERPL-SCNC: 28 MMOL/L (ref 20–32)
CREAT SERPL-MCNC: 0.96 MG/DL (ref 0.7–1.28)
EGFRCR SERPLBLD CKD-EPI 2021: 84 ML/MIN/1.73M2
EOSINOPHIL # BLD AUTO: 121 CELLS/UL (ref 15–500)
EOSINOPHIL NFR BLD AUTO: 1.8 %
ERYTHROCYTE [DISTWIDTH] IN BLOOD BY AUTOMATED COUNT: 12.7 % (ref 11–15)
EST. AVERAGE GLUCOSE BLD GHB EST-MCNC: 114 MG/DL
EST. AVERAGE GLUCOSE BLD GHB EST-SCNC: 6.3 MMOL/L
GLUCOSE SERPL-MCNC: 89 MG/DL (ref 65–99)
HBA1C MFR BLD: 5.6 % OF TOTAL HGB
HCT VFR BLD AUTO: 49 % (ref 38.5–50)
HDLC SERPL-MCNC: 48 MG/DL
HGB BLD-MCNC: 16.4 G/DL (ref 13.2–17.1)
LDLC SERPL CALC-MCNC: 72 MG/DL (CALC)
LYMPHOCYTES # BLD AUTO: 1883 CELLS/UL (ref 850–3900)
LYMPHOCYTES NFR BLD AUTO: 28.1 %
MCH RBC QN AUTO: 29.5 PG (ref 27–33)
MCHC RBC AUTO-ENTMCNC: 33.5 G/DL (ref 32–36)
MCV RBC AUTO: 88.1 FL (ref 80–100)
MONOCYTES # BLD AUTO: 637 CELLS/UL (ref 200–950)
MONOCYTES NFR BLD AUTO: 9.5 %
NEUTROPHILS # BLD AUTO: 4033 CELLS/UL (ref 1500–7800)
NEUTROPHILS NFR BLD AUTO: 60.2 %
NONHDLC SERPL-MCNC: 92 MG/DL (CALC)
PLATELET # BLD AUTO: 267 THOUSAND/UL (ref 140–400)
PMV BLD REES-ECKER: 9.9 FL (ref 7.5–12.5)
POTASSIUM SERPL-SCNC: 4.2 MMOL/L (ref 3.5–5.3)
PROT SERPL-MCNC: 6.9 G/DL (ref 6.1–8.1)
PSA SERPL-MCNC: 0.26 NG/ML
RBC # BLD AUTO: 5.56 MILLION/UL (ref 4.2–5.8)
SODIUM SERPL-SCNC: 140 MMOL/L (ref 135–146)
T4 FREE SERPL-MCNC: 1.4 NG/DL (ref 0.8–1.8)
TRIGL SERPL-MCNC: 113 MG/DL
TSH SERPL-ACNC: 0.5 MIU/L (ref 0.4–4.5)
WBC # BLD AUTO: 6.7 THOUSAND/UL (ref 3.8–10.8)

## 2025-04-08 ASSESSMENT — ENCOUNTER SYMPTOMS
NUMBNESS: 0
MYALGIAS: 1
HEADACHES: 0
POLYPHAGIA: 0
POLYDIPSIA: 0
CHEST TIGHTNESS: 0
BACK PAIN: 1
APPETITE CHANGE: 0
DIARRHEA: 0
SHORTNESS OF BREATH: 0
DIZZINESS: 0
ARTHRALGIAS: 1
FATIGUE: 0
FREQUENCY: 0
VOMITING: 0
NAUSEA: 0
WEAKNESS: 0

## 2025-04-08 NOTE — ASSESSMENT & PLAN NOTE
Orders:    Follow Up In [facility] - PCP; Future    CBC and Auto Differential; Future    Comprehensive Metabolic Panel; Future    Lipid Panel; Future    Prostate Specific Antigen; Future    Hemoglobin A1C; Future    Thyroid Stimulating Hormone; Future    T4, free; Future

## 2025-04-09 ENCOUNTER — TELEPHONE (OUTPATIENT)
Dept: PRIMARY CARE | Facility: CLINIC | Age: 75
End: 2025-04-09
Payer: MEDICARE

## 2025-04-09 ENCOUNTER — EVALUATION (OUTPATIENT)
Dept: PHYSICAL THERAPY | Facility: CLINIC | Age: 75
End: 2025-04-09
Payer: MEDICARE

## 2025-04-09 DIAGNOSIS — M54.50 ACUTE LEFT-SIDED LOW BACK PAIN WITHOUT SCIATICA: Primary | ICD-10-CM

## 2025-04-09 PROCEDURE — 97110 THERAPEUTIC EXERCISES: CPT | Mod: GP

## 2025-04-09 PROCEDURE — 97161 PT EVAL LOW COMPLEX 20 MIN: CPT | Mod: GP

## 2025-04-09 NOTE — PROGRESS NOTES
Physical Therapy Evaluation and Treatment      Patient Name: Hunter Combs  MRN: [id number]  Today's Date: 4/9/2025  Visit #1  Time Calculation  Start Time: 1357  Stop Time: 1428  Time Calculation (min): 31 min    Insurance:  Info: 2025  90 DAY 7/9/2025   Eval Date: 4/9/25    Assessment:  Hunter Combs is a 74 year old M referred to outpatient PT for LBP. Examination findings are consistent with an acute flare up of chronic LBP. Pt has hx of discogenic pain and is presenting with an extension bias at this time. Impairments, listed below, limit pt's ability to sit for prolonged time, lift items and perform household and recreational ADLs of gardening/yard work. Patient will benefit from physical therapy services to improve listed impairments. Initiated extension bias treatment program today to address these impairments, pt tolerated well having small decrease in familiar sx.     Patient with the following impairments: decreased muscle performance, decreased ROM, decreased activity tolerance, pain, participation restrictions, and difficulty with ADL completion    Patient's response to session: Decreased pain and Increased knowledge and understanding    Plan:     1 times every week for a total of 6 visits.  Re-assessment after that time.    Next Session Considerations:  -Cont extension bias program.   -Add goblet squat, bird dog, TA w/marching/deadbug, and tb deadlift as tolerated.     Current Problem:   1. Acute left-sided low back pain without sciatica  Referral to Physical Therapy    Follow Up In Physical Therapy          Subjective   Hunter Combs is a 74 year old M referred to outpatient PT for LBP. Pt reports he has a hx of chronic LBP and on Saturday his Left low back flared up. He has had a herniated disc before and that's kind of what it feels like. On Sunday it was so bad he couldn't get out of the chair. He saw his PCP on Monday and was put on steroids and sent here (PT). The steroids seem to be helping  "already and he wants to make sure he got on top of this to prevent worsening.   Onset Date: History of chronic LBP, acute flare up occurred Saturday.   CAMMIE: Sudden onset, w/out mechanism.     Pt describes pain as sharp   Pain is worse with getting up from sitting, and bending over.   Pain is better with standing, moving.     Pain Rating: (Numeric Pain Scale: #/10)  Current: 2  Best: 0  Worst: 6    Patient denies numbness/tingling, changes in bowel/bladder, saddle paresthesias, and falls.     Hx of:  - CVA n  - Heart conditions (afib, HTN, OH) y, open heart surgery double bi-pass aortic valve replacement.   - DM n  - Major Surgeries: y, Heart surgery, cataract  - Unexplained Weight Loss/Gain: n  - Other Significant PMH of Note: n    Previous Interventions/Treatments: PT seemed to help.      Prior Level of Function (PLOF)  Exercise/Physical Activity: Yard work, gardening.         Patient Goal: \"Strengthen and eliminate the pain.\"                   Objective   Lower Quarter Screen:    DTR (L/R)  Patellar L4: 2+/2+  Achilles S1: 2+/2+    Dermatomes intact BLE    LE Strength Screening:   RLE LLE   L2 Hip Flex 4+/5 4+/5   L3 Knee Ext 4+/5 4+/5   S2 Knee Flex 4+/5 4+/5   L4 Ankle DF 4+/5 4+/5   S1 Ankle PF 4+/5 4+/5       Lumbar ROM  Flexion: Min loss - provoking of \"tightness\"  Extension: No loss  Side bend (L/R): Min loss/Min loss  Rotation (L/R): Min loss/Min loss   Negative    Repeated Motions  Baseline sx: 2/10  Flexion: x10 in standing   Sx afterward: Tightness in low back.     Baseline sx: 2/10 on R side.   Extension: x10 prone pressups after 1min prone prop  Sx afterward: decreased subjective pain, negative SLR and crossed SLR.       Specific Lower Extremity MMT (L/R)  Iliopsoas: 4/5, 4/5  Gluteus Ran (prone): 4-/5, 4-/5 demo'd during bridge      Special Tests (L/R):  Straight Leg Raise: Negative, Positive  Wells Crossed SLR: Positive, Negative (LLE SLR causing R sided LBP)  Slump Test: NT        Outcome " Measures:    Sameer = 6/50 = 12% disability.       Treatments:  Therapeutic Exercise (12146): 15 minutes  X1 set of ea HEP activity    -Patient was educated on PT diagnosis, plan of care, role of therapy, activity modification, symptom monitoring, and home exercise program.   Pt educated on symptoms, anatomy, and benefits of exercises provided in HEP.  -In depth discussion related to pain neuro-ed of acute flare up of chronic LBP. Encouraged pt to participate in as much activity as normal as long as sx are not exacerbated. Emphasized especially walking for sx's.   -Discussed extension bias as management tool for sx exacerbation, and benefits of, only temporarily decreasing amount of overall spine flexion activities (sitting, lifting, etc) until sx aggravation calms down. Discussed that this is only temporary and will reintroduce these mvmnts as tolerated.           HEP:  Access Code: [email or web address]  URL: [email or web address]  Date: 04/09/2025  Prepared by: Ace Sears    Exercises  - Prone Press Up On Elbows  - 1 x daily - 4-5 x weekly - 3 sets - 10 reps  - Prone Press Up  - 1 x daily - 4-5 x weekly - 3 sets - 10 reps  - Supine Bridge  - 1 x daily - 4-5 x weekly - 3 sets - 10 reps  - Standing Anti-Rotation Press with Anchored Resistance  - 1 x daily - 4-5 x weekly - 3 sets - 10 reps  -Encouraged daily walking as tolerated.   ^  Education and discussion on HEP and treatment regarding the benefits related to current condition, POC, pathophysiology, and precautions        Goals:  Patient will improve Modified Oswestry Low Back Pain Disability Index score to </= 3/50 improve performance of ADLs.     Patient will be independent with home exercise program for proper self-management of condition.    Patient will improve pain free active range of motion in deficit areas for ADL completion.    Patient will subjectively report the ability to perform yard work and gardening without familiar sx > 1/10.    Low  back pain will improve to be able to bend below the waist and lift 20lbs intermittently for >/= 2 hours for household ADLs.     LBP will improve to perform prolonged sitting for 4 hours daily for household activities.

## 2025-04-09 NOTE — TELEPHONE ENCOUNTER
----- Message from Sabino Gibson sent at 4/9/2025  7:12 AM EDT -----  Call Hunter Combs Their labs were normal   2 seconds or less

## 2025-04-15 ENCOUNTER — APPOINTMENT (OUTPATIENT)
Dept: PHYSICAL THERAPY | Facility: CLINIC | Age: 75
End: 2025-04-15
Payer: MEDICARE

## 2025-04-21 ENCOUNTER — TREATMENT (OUTPATIENT)
Dept: PHYSICAL THERAPY | Facility: CLINIC | Age: 75
End: 2025-04-21
Payer: MEDICARE

## 2025-04-21 DIAGNOSIS — M54.50 ACUTE LEFT-SIDED LOW BACK PAIN WITHOUT SCIATICA: Primary | ICD-10-CM

## 2025-04-21 PROCEDURE — 97110 THERAPEUTIC EXERCISES: CPT | Mod: GP

## 2025-04-21 NOTE — PROGRESS NOTES
Physical Therapy Treatment      Patient Name: Hunter Combs  MRN: [id number]  Today's Date: 4/21/2025  Visit #: 2  Insurance: Info: 2025  90 DAY 7/9/2025   Time Calculation  Start Time: 1046  Stop Time: 1124  Time Calculation (min): 38 min    1. Acute left-sided low back pain without sciatica  Follow Up In Physical Therapy          ASSESSMENT:  Pt presenting w/min increase in sx after having lifted grandchildren over the weekend. He benefits from variety of both flexion and extension mobility this session to improve sx, only consistent direction preference is w/lower trunk rotation to L today. Pt cont's to be limited in abdominal/lumbar strength isabel extensor. These impairments limit his ability to perform sx free household, and recreational ADLs, pt to cont to benefit from skilled PT to improve CLOF.       GOALS:  Patient will improve Modified Oswestry Low Back Pain Disability Index score to </= 3/50 improve performance of ADLs.      Patient will be independent with home exercise program for proper self-management of condition.     Patient will improve pain free active range of motion in deficit areas for ADL completion.     Patient will subjectively report the ability to perform yard work and gardening without familiar sx > 1/10.     Low back pain will improve to be able to bend below the waist and lift 20lbs intermittently for >/= 2 hours for household ADLs.      LBP will improve to perform prolonged sitting for 4 hours daily for household activities.      PLAN:  Cont per POC.     Next Session Considerations:  -Monitor extension bias program as tolerated, last session pt did not tolerate end range extension well.   -Add goblet squat, bird dog, TA w/marching/deadbug, and tb deadlift as tolerated.       SUBJECTIVE:  Pt reports he was doing really good until yesterday. He had some grandkids over on Saturday, and picking up grand kids must've made the back sore. Pt reports standing hurts worst than anything as of  late.         OBJECTIVE:  Treatments:  Therapeutic Exercise (13227): 38 minutes  Treadmill Ambulation, dynamic warmup - 5min   Prone Prop  Prone pressups  Lower Trunk Rotations to the L only - x20  Seated Lumbar Flexion - [id number] ea  TA w/marching- 2x20  Bridges 2x10  Pallof Press - 2x10, 7.5lbs  Air Squat - mild discomfort.   Squat w/physioball against wall - 2x10, improved tolerance.     Manual Therapy (14144):   minutes  Not performed    Neuromuscular Re-education (77715):   minutes   Not performed    Therapeutic Activitity (91407):   minutes  Not performed            HEP:  Access Code: [email or web address]  URL: [email or web address]  Date: 04/09/2025  Prepared by: Ace Sears     Exercises  - Prone Press Up On Elbows  - 1 x daily - 4-5 x weekly - 3 sets - 10 reps  - Prone Press Up  - 1 x daily - 4-5 x weekly - 3 sets - 10 reps  - Supine Bridge  - 1 x daily - 4-5 x weekly - 3 sets - 10 reps  - Standing Anti-Rotation Press with Anchored Resistance  - 1 x daily - 4-5 x weekly - 3 sets - 10 reps  - Seated Flexion Stretch with Swiss Ball  - 1 x daily - 3-4 x weekly - 2 sets - 10 reps  - Wall Quarter Squat with Swiss Ball  - 1 x daily - 3-4 x weekly - 3 sets - 10 reps  -Encouraged daily walking as tolerated.   ^  Education and discussion on HEP and treatment regarding the benefits related to current condition, POC, pathophysiology, and precautions

## 2025-04-29 ENCOUNTER — TREATMENT (OUTPATIENT)
Dept: PHYSICAL THERAPY | Facility: CLINIC | Age: 75
End: 2025-04-29
Payer: MEDICARE

## 2025-04-29 DIAGNOSIS — M54.50 ACUTE LEFT-SIDED LOW BACK PAIN WITHOUT SCIATICA: Primary | ICD-10-CM

## 2025-04-29 PROCEDURE — 97110 THERAPEUTIC EXERCISES: CPT | Mod: GP

## 2025-04-29 NOTE — PROGRESS NOTES
Physical Therapy Treatment      Patient Name: Hunter Combs  MRN: [id number]  Today's Date: 4/29/2025  Visit #: 3  Insurance: Info: 2025  90 DAY 7/9/2025   Time Calculation  Start Time: 1400  Stop Time: 1441  Time Calculation (min): 41 min    1. Acute left-sided low back pain without sciatica  Follow Up In Physical Therapy          ASSESSMENT:  Pt presenting w/improved sx compared to last session. After week long flexion direction preference performance and pt having more consistent sx flare up, returning to extension bias program this date. He tolerates pressups better w/prolonged prone lying prior to pressups and has sx improvement after performing. Pt cont's to be limited by decreased posterior chain strength and pain. Pt to cont to benefit from skilled PT to improve CLOF.           GOALS:  Patient will improve Modified Oswestry Low Back Pain Disability Index score to </= 3/50 improve performance of ADLs.      Patient will be independent with home exercise program for proper self-management of condition.     Patient will improve pain free active range of motion in deficit areas for ADL completion.     Patient will subjectively report the ability to perform yard work and gardening without familiar sx > 1/10.     Low back pain will improve to be able to bend below the waist and lift 20lbs intermittently for >/= 2 hours for household ADLs.      LBP will improve to perform prolonged sitting for 4 hours daily for household activities.      PLAN:  Cont per POC.     Next Session Considerations:  -Monitor extension bias program as tolerated, last session pt did not tolerate end range extension well.   -Progress goblet squat, bird dog, TA w/marching/deadbug, and tb deadlift as tolerated.       SUBJECTIVE:  Pt reports the flare up he was having last week is much better now. Still having some pain when he walks and does the prone prop exercises.       OBJECTIVE:  Treatments:  Therapeutic Exercise (65333): 41  minutes  Treadmill Ambulation, dynamic warmup - 5min   Lower Trunk Rotations bilaterally - x20  Bridges 2x10, 10lb ankle weight.  Deadbug LE only - 2x10   Bird Dog LE only - 2x10   Side plank - 20sec, challenged, poor wrist tolerance.  TB Deadlift - 2x10, challenged w/technique, min sx provocation by end.   Prone Lying - 1min  Prone Prop - 1min  Press Up - x10 -- improved tolerance w/lying progression prior to pressups.   Squat w/physioball against wall - 2x10, improved tolerance.   HEP update/review.     Manual Therapy (94372):   minutes  Not performed    Neuromuscular Re-education (65116):   minutes   Not performed    Therapeutic Activitity (06739):   minutes  Not performed            HEP:  Access Code: [email or web address]  URL: [email or web address]  Date: 04/09/2025  Prepared by: Ace Sears     Exercises  - Prone Press Up On Elbows  - 1 x daily - 4-5 x weekly - 3 sets - 10 reps  - Prone Press Up  - 1 x daily - 4-5 x weekly - 3 sets - 10 reps  - Supine Bridge  - 1 x daily - 4-5 x weekly - 3 sets - 10 reps  - Standing Anti-Rotation Press with Anchored Resistance  - 1 x daily - 4-5 x weekly - 3 sets - 10 reps  - Seated Flexion Stretch with Swiss Ball  - 1 x daily - 3-4 x weekly - 2 sets - 10 reps  - Wall Quarter Squat with Swiss Ball  - 1 x daily - 3-4 x weekly - 3 sets - 10 reps  -Encouraged daily walking as tolerated.   - Bird Dog Legs Only  - 1 x daily - 5-7 x weekly - 3 sets - 10 reps  ^  Education and discussion on HEP and treatment regarding the benefits related to current condition, POC, pathophysiology, and precautions

## 2025-05-06 ENCOUNTER — APPOINTMENT (OUTPATIENT)
Dept: PHYSICAL THERAPY | Facility: CLINIC | Age: 75
End: 2025-05-06
Payer: MEDICARE

## 2025-05-06 DIAGNOSIS — M54.50 ACUTE LEFT-SIDED LOW BACK PAIN WITHOUT SCIATICA: Primary | ICD-10-CM

## 2025-08-22 ENCOUNTER — APPOINTMENT (OUTPATIENT)
Dept: ORTHOPEDIC SURGERY | Facility: CLINIC | Age: 75
End: 2025-08-22
Payer: MEDICARE

## 2025-08-22 DIAGNOSIS — M17.11 PRIMARY OSTEOARTHRITIS OF RIGHT KNEE: Primary | ICD-10-CM

## 2025-08-22 RX ORDER — TRIAMCINOLONE ACETONIDE 40 MG/ML
40 INJECTION, SUSPENSION INTRA-ARTICULAR; INTRAMUSCULAR
Status: COMPLETED | OUTPATIENT
Start: 2025-08-22 | End: 2025-08-22

## 2025-08-22 RX ORDER — LIDOCAINE HYDROCHLORIDE 10 MG/ML
4 INJECTION, SOLUTION INFILTRATION; PERINEURAL
Status: COMPLETED | OUTPATIENT
Start: 2025-08-22 | End: 2025-08-22

## 2025-08-22 RX ADMIN — LIDOCAINE HYDROCHLORIDE 4 ML: 10 INJECTION, SOLUTION INFILTRATION; PERINEURAL at 13:11

## 2025-08-22 RX ADMIN — TRIAMCINOLONE ACETONIDE 40 MG: 40 INJECTION, SUSPENSION INTRA-ARTICULAR; INTRAMUSCULAR at 13:11

## 2026-04-07 ENCOUNTER — APPOINTMENT (OUTPATIENT)
Dept: PRIMARY CARE | Facility: CLINIC | Age: 76
End: 2026-04-07
Payer: MEDICARE